# Patient Record
Sex: FEMALE | Race: OTHER | NOT HISPANIC OR LATINO
[De-identification: names, ages, dates, MRNs, and addresses within clinical notes are randomized per-mention and may not be internally consistent; named-entity substitution may affect disease eponyms.]

---

## 2018-11-26 PROBLEM — Z00.00 ENCOUNTER FOR PREVENTIVE HEALTH EXAMINATION: Status: ACTIVE | Noted: 2018-11-26

## 2018-11-28 ENCOUNTER — ASOB RESULT (OUTPATIENT)
Age: 28
End: 2018-11-28

## 2018-11-28 ENCOUNTER — APPOINTMENT (OUTPATIENT)
Dept: ANTEPARTUM | Facility: CLINIC | Age: 28
End: 2018-11-28
Payer: COMMERCIAL

## 2018-11-28 PROCEDURE — 76805 OB US >/= 14 WKS SNGL FETUS: CPT

## 2019-01-30 ENCOUNTER — OUTPATIENT (OUTPATIENT)
Dept: OUTPATIENT SERVICES | Facility: HOSPITAL | Age: 29
LOS: 1 days | End: 2019-01-30

## 2019-01-30 VITALS — TEMPERATURE: 98 F

## 2019-01-30 DIAGNOSIS — Z3A.00 WEEKS OF GESTATION OF PREGNANCY NOT SPECIFIED: ICD-10-CM

## 2019-01-30 DIAGNOSIS — O26.899 OTHER SPECIFIED PREGNANCY RELATED CONDITIONS, UNSPECIFIED TRIMESTER: ICD-10-CM

## 2019-01-30 LAB
APPEARANCE UR: CLEAR — SIGNIFICANT CHANGE UP
BASOPHILS # BLD AUTO: 0.01 K/UL — SIGNIFICANT CHANGE UP (ref 0–0.2)
BASOPHILS NFR BLD AUTO: 0.1 % — SIGNIFICANT CHANGE UP (ref 0–2)
BILIRUB UR-MCNC: NEGATIVE — SIGNIFICANT CHANGE UP
BLD GP AB SCN SERPL QL: NEGATIVE — SIGNIFICANT CHANGE UP
BLOOD UR QL VISUAL: NEGATIVE — SIGNIFICANT CHANGE UP
COLOR SPEC: SIGNIFICANT CHANGE UP
EOSINOPHIL # BLD AUTO: 0.07 K/UL — SIGNIFICANT CHANGE UP (ref 0–0.5)
EOSINOPHIL NFR BLD AUTO: 0.5 % — SIGNIFICANT CHANGE UP (ref 0–6)
FIBRINOGEN PPP-MCNC: 746.3 MG/DL — HIGH (ref 350–510)
GLUCOSE UR-MCNC: NEGATIVE — SIGNIFICANT CHANGE UP
HCT VFR BLD CALC: 33.1 % — LOW (ref 34.5–45)
HGB BLD-MCNC: 11 G/DL — LOW (ref 11.5–15.5)
IMM GRANULOCYTES NFR BLD AUTO: 0.4 % — SIGNIFICANT CHANGE UP (ref 0–1.5)
KETONES UR-MCNC: NEGATIVE — SIGNIFICANT CHANGE UP
LEUKOCYTE ESTERASE UR-ACNC: NEGATIVE — SIGNIFICANT CHANGE UP
LYMPHOCYTES # BLD AUTO: 1.89 K/UL — SIGNIFICANT CHANGE UP (ref 1–3.3)
LYMPHOCYTES # BLD AUTO: 14.1 % — SIGNIFICANT CHANGE UP (ref 13–44)
MCHC RBC-ENTMCNC: 31 PG — SIGNIFICANT CHANGE UP (ref 27–34)
MCHC RBC-ENTMCNC: 33.2 % — SIGNIFICANT CHANGE UP (ref 32–36)
MCV RBC AUTO: 93.2 FL — SIGNIFICANT CHANGE UP (ref 80–100)
MONOCYTES # BLD AUTO: 0.79 K/UL — SIGNIFICANT CHANGE UP (ref 0–0.9)
MONOCYTES NFR BLD AUTO: 5.9 % — SIGNIFICANT CHANGE UP (ref 2–14)
NEUTROPHILS # BLD AUTO: 10.56 K/UL — HIGH (ref 1.8–7.4)
NEUTROPHILS NFR BLD AUTO: 79 % — HIGH (ref 43–77)
NITRITE UR-MCNC: NEGATIVE — SIGNIFICANT CHANGE UP
NRBC # FLD: 0 K/UL — LOW (ref 25–125)
PH UR: 6.5 — SIGNIFICANT CHANGE UP (ref 5–8)
PLATELET # BLD AUTO: 386 K/UL — SIGNIFICANT CHANGE UP (ref 150–400)
PMV BLD: 8.3 FL — SIGNIFICANT CHANGE UP (ref 7–13)
PROT UR-MCNC: 10 — SIGNIFICANT CHANGE UP
RBC # BLD: 3.55 M/UL — LOW (ref 3.8–5.2)
RBC # FLD: 12.1 % — SIGNIFICANT CHANGE UP (ref 10.3–14.5)
RH IG SCN BLD-IMP: POSITIVE — SIGNIFICANT CHANGE UP
SP GR SPEC: 1.01 — SIGNIFICANT CHANGE UP (ref 1–1.04)
UROBILINOGEN FLD QL: NORMAL — SIGNIFICANT CHANGE UP
WBC # BLD: 13.37 K/UL — HIGH (ref 3.8–10.5)
WBC # FLD AUTO: 13.37 K/UL — HIGH (ref 3.8–10.5)

## 2019-01-30 RX ORDER — SODIUM CHLORIDE 9 MG/ML
1000 INJECTION, SOLUTION INTRAVENOUS ONCE
Qty: 0 | Refills: 0 | Status: COMPLETED | OUTPATIENT
Start: 2019-01-30 | End: 2019-01-30

## 2019-01-30 RX ADMIN — SODIUM CHLORIDE 1000 MILLILITER(S): 9 INJECTION, SOLUTION INTRAVENOUS at 22:03

## 2019-01-31 ENCOUNTER — ASOB RESULT (OUTPATIENT)
Age: 29
End: 2019-01-31

## 2019-01-31 ENCOUNTER — TRANSCRIPTION ENCOUNTER (OUTPATIENT)
Age: 29
End: 2019-01-31

## 2019-01-31 ENCOUNTER — OUTPATIENT (OUTPATIENT)
Dept: OUTPATIENT SERVICES | Facility: HOSPITAL | Age: 29
LOS: 1 days | End: 2019-01-31

## 2019-01-31 ENCOUNTER — APPOINTMENT (OUTPATIENT)
Dept: ANTEPARTUM | Facility: CLINIC | Age: 29
End: 2019-01-31
Payer: COMMERCIAL

## 2019-01-31 VITALS — WEIGHT: 165.35 LBS | HEIGHT: 66 IN

## 2019-01-31 DIAGNOSIS — Z04.9 ENCOUNTER FOR EXAMINATION AND OBSERVATION FOR UNSPECIFIED REASON: ICD-10-CM

## 2019-01-31 DIAGNOSIS — Z3A.00 WEEKS OF GESTATION OF PREGNANCY NOT SPECIFIED: ICD-10-CM

## 2019-01-31 DIAGNOSIS — O26.90 PREGNANCY RELATED CONDITIONS, UNSPECIFIED, UNSPECIFIED TRIMESTER: ICD-10-CM

## 2019-01-31 LAB
HBV SURFACE AG SER-ACNC: NEGATIVE — SIGNIFICANT CHANGE UP
HIV COMBO RESULT: SIGNIFICANT CHANGE UP
HIV1+2 AB SPEC QL: SIGNIFICANT CHANGE UP
RH IG SCN BLD-IMP: POSITIVE — SIGNIFICANT CHANGE UP

## 2019-01-31 PROCEDURE — 76816 OB US FOLLOW-UP PER FETUS: CPT | Mod: 26

## 2019-01-31 PROCEDURE — 76819 FETAL BIOPHYS PROFIL W/O NST: CPT | Mod: 26

## 2019-01-31 RX ORDER — SODIUM CHLORIDE 9 MG/ML
1000 INJECTION, SOLUTION INTRAVENOUS
Qty: 0 | Refills: 0 | Status: DISCONTINUED | OUTPATIENT
Start: 2019-01-31 | End: 2019-01-31

## 2019-01-31 RX ADMIN — Medication 12 MILLIGRAM(S): at 03:14

## 2019-01-31 RX ADMIN — SODIUM CHLORIDE 125 MILLILITER(S): 9 INJECTION, SOLUTION INTRAVENOUS at 03:20

## 2019-01-31 NOTE — DISCHARGE NOTE ANTEPARTUM - CARE PROVIDER_API CALL
Kerrie Sim)  [address], [address] [place] [address]  Phone: [phone]  Fax: [phone]

## 2019-01-31 NOTE — DISCHARGE NOTE ANTEPARTUM - CARE PLAN
Principal Discharge DX:	Fall  Goal:	recovery  Assessment and plan of treatment:	Please return to labor and delivery at 12am for dose of betamethasone.   Please return to labor and delivery if experiencing abdominal pain, contractions, vaginal bleeding, loss of fluid, decreased fetal movement.

## 2019-01-31 NOTE — DISCHARGE NOTE ANTEPARTUM - HOSPITAL COURSE
Pt was admitted for observation after fall, no abdominal trauma. EFM was reassuring. Irregular contractions noted at the beginning of admission, but noted to resolve during hospital course. Betamethasone was given for fetal lung maturity. Labs wnl. Pt denies feeling contractions currently.  labor precautions given. Pt understands to return to hospital at 12am for repeat dose of betamethasone.  labor precautions and abruption precautions given.

## 2019-01-31 NOTE — DISCHARGE NOTE ANTEPARTUM - PLAN OF CARE
recovery Please return to labor and delivery at 12am for dose of betamethasone.   Please return to labor and delivery if experiencing abdominal pain, contractions, vaginal bleeding, loss of fluid, decreased fetal movement.

## 2019-01-31 NOTE — DISCHARGE NOTE ANTEPARTUM - PATIENT PORTAL LINK FT
You can access the [facility] Patient Portal, offered by [facility], by registering with the following website: [email or web address]

## 2019-02-01 LAB
RUBV IGG SER-ACNC: 1 INDEX — SIGNIFICANT CHANGE UP
RUBV IGG SER-IMP: SIGNIFICANT CHANGE UP
SPECIMEN SOURCE: SIGNIFICANT CHANGE UP
T PALLIDUM AB TITR SER: NEGATIVE — SIGNIFICANT CHANGE UP

## 2019-02-01 RX ADMIN — Medication 12 MILLIGRAM(S): at 00:16

## 2019-02-02 LAB — GP B STREP GENITAL QL CULT: SIGNIFICANT CHANGE UP

## 2019-02-05 DIAGNOSIS — Z3A.30 30 WEEKS GESTATION OF PREGNANCY: ICD-10-CM

## 2019-02-21 ENCOUNTER — APPOINTMENT (OUTPATIENT)
Dept: ANTEPARTUM | Facility: CLINIC | Age: 29
End: 2019-02-21
Payer: COMMERCIAL

## 2019-02-21 ENCOUNTER — ASOB RESULT (OUTPATIENT)
Age: 29
End: 2019-02-21

## 2019-02-21 PROCEDURE — 76819 FETAL BIOPHYS PROFIL W/O NST: CPT

## 2019-02-21 PROCEDURE — 76805 OB US >/= 14 WKS SNGL FETUS: CPT

## 2019-02-21 PROCEDURE — 99241 OFFICE CONSULTATION NEW/ESTAB PATIENT 15 MIN: CPT | Mod: 25

## 2019-03-21 ENCOUNTER — ASOB RESULT (OUTPATIENT)
Age: 29
End: 2019-03-21

## 2019-03-21 ENCOUNTER — APPOINTMENT (OUTPATIENT)
Dept: ANTEPARTUM | Facility: CLINIC | Age: 29
End: 2019-03-21
Payer: COMMERCIAL

## 2019-03-21 PROCEDURE — 76819 FETAL BIOPHYS PROFIL W/O NST: CPT

## 2019-03-21 PROCEDURE — 76816 OB US FOLLOW-UP PER FETUS: CPT

## 2019-03-29 ENCOUNTER — TRANSCRIPTION ENCOUNTER (OUTPATIENT)
Age: 29
End: 2019-03-29

## 2019-04-09 ENCOUNTER — INPATIENT (INPATIENT)
Facility: HOSPITAL | Age: 29
LOS: 1 days | Discharge: HOME | End: 2019-04-11
Attending: OBSTETRICS & GYNECOLOGY | Admitting: OBSTETRICS & GYNECOLOGY

## 2019-04-09 ENCOUNTER — TRANSCRIPTION ENCOUNTER (OUTPATIENT)
Age: 29
End: 2019-04-09

## 2019-04-09 DIAGNOSIS — Z3A.00 WEEKS OF GESTATION OF PREGNANCY NOT SPECIFIED: ICD-10-CM

## 2019-04-09 DIAGNOSIS — O26.899 OTHER SPECIFIED PREGNANCY RELATED CONDITIONS, UNSPECIFIED TRIMESTER: ICD-10-CM

## 2019-04-09 LAB
BASOPHILS # BLD AUTO: 0.02 K/UL — SIGNIFICANT CHANGE UP (ref 0–0.2)
BASOPHILS NFR BLD AUTO: 0.2 % — SIGNIFICANT CHANGE UP (ref 0–2)
BLD GP AB SCN SERPL QL: NEGATIVE — SIGNIFICANT CHANGE UP
EOSINOPHIL # BLD AUTO: 0.02 K/UL — SIGNIFICANT CHANGE UP (ref 0–0.5)
EOSINOPHIL NFR BLD AUTO: 0.2 % — SIGNIFICANT CHANGE UP (ref 0–6)
HCT VFR BLD CALC: 34.7 % — SIGNIFICANT CHANGE UP (ref 34.5–45)
HGB BLD-MCNC: 11.8 G/DL — SIGNIFICANT CHANGE UP (ref 11.5–15.5)
IMM GRANULOCYTES NFR BLD AUTO: 0.6 % — SIGNIFICANT CHANGE UP (ref 0–1.5)
LYMPHOCYTES # BLD AUTO: 1.46 K/UL — SIGNIFICANT CHANGE UP (ref 1–3.3)
LYMPHOCYTES # BLD AUTO: 11.4 % — LOW (ref 13–44)
MCHC RBC-ENTMCNC: 31.1 PG — SIGNIFICANT CHANGE UP (ref 27–34)
MCHC RBC-ENTMCNC: 34 % — SIGNIFICANT CHANGE UP (ref 32–36)
MCV RBC AUTO: 91.6 FL — SIGNIFICANT CHANGE UP (ref 80–100)
MONOCYTES # BLD AUTO: 0.78 K/UL — SIGNIFICANT CHANGE UP (ref 0–0.9)
MONOCYTES NFR BLD AUTO: 6.1 % — SIGNIFICANT CHANGE UP (ref 2–14)
NEUTROPHILS # BLD AUTO: 10.5 K/UL — HIGH (ref 1.8–7.4)
NEUTROPHILS NFR BLD AUTO: 81.5 % — HIGH (ref 43–77)
NRBC # FLD: 0 K/UL — SIGNIFICANT CHANGE UP (ref 0–0)
PLATELET # BLD AUTO: 351 K/UL — SIGNIFICANT CHANGE UP (ref 150–400)
PMV BLD: 9 FL — SIGNIFICANT CHANGE UP (ref 7–13)
RBC # BLD: 3.79 M/UL — LOW (ref 3.8–5.2)
RBC # FLD: 13.3 % — SIGNIFICANT CHANGE UP (ref 10.3–14.5)
RH IG SCN BLD-IMP: POSITIVE — SIGNIFICANT CHANGE UP
WBC # BLD: 12.86 K/UL — HIGH (ref 3.8–10.5)
WBC # FLD AUTO: 12.86 K/UL — HIGH (ref 3.8–10.5)

## 2019-04-09 RX ORDER — HYDROCORTISONE 1 %
1 OINTMENT (GRAM) TOPICAL EVERY 4 HOURS
Qty: 0 | Refills: 0 | Status: DISCONTINUED | OUTPATIENT
Start: 2019-04-09 | End: 2019-04-09

## 2019-04-09 RX ORDER — LANOLIN
1 OINTMENT (GRAM) TOPICAL EVERY 6 HOURS
Qty: 0 | Refills: 0 | Status: DISCONTINUED | OUTPATIENT
Start: 2019-04-09 | End: 2019-04-11

## 2019-04-09 RX ORDER — DIBUCAINE 1 %
1 OINTMENT (GRAM) RECTAL EVERY 4 HOURS
Qty: 0 | Refills: 0 | Status: DISCONTINUED | OUTPATIENT
Start: 2019-04-09 | End: 2019-04-11

## 2019-04-09 RX ORDER — OXYCODONE HYDROCHLORIDE 5 MG/1
5 TABLET ORAL
Qty: 0 | Refills: 0 | Status: DISCONTINUED | OUTPATIENT
Start: 2019-04-09 | End: 2019-04-11

## 2019-04-09 RX ORDER — SODIUM CHLORIDE 9 MG/ML
3 INJECTION INTRAMUSCULAR; INTRAVENOUS; SUBCUTANEOUS EVERY 8 HOURS
Qty: 0 | Refills: 0 | Status: DISCONTINUED | OUTPATIENT
Start: 2019-04-09 | End: 2019-04-09

## 2019-04-09 RX ORDER — DIBUCAINE 1 %
1 OINTMENT (GRAM) RECTAL EVERY 4 HOURS
Qty: 0 | Refills: 0 | Status: DISCONTINUED | OUTPATIENT
Start: 2019-04-09 | End: 2019-04-09

## 2019-04-09 RX ORDER — SODIUM CHLORIDE 9 MG/ML
1000 INJECTION, SOLUTION INTRAVENOUS
Qty: 0 | Refills: 0 | Status: DISCONTINUED | OUTPATIENT
Start: 2019-04-09 | End: 2019-04-09

## 2019-04-09 RX ORDER — PRAMOXINE HYDROCHLORIDE 150 MG/15G
1 AEROSOL, FOAM RECTAL EVERY 4 HOURS
Qty: 0 | Refills: 0 | Status: DISCONTINUED | OUTPATIENT
Start: 2019-04-09 | End: 2019-04-11

## 2019-04-09 RX ORDER — SODIUM CHLORIDE 9 MG/ML
3 INJECTION INTRAMUSCULAR; INTRAVENOUS; SUBCUTANEOUS EVERY 8 HOURS
Qty: 0 | Refills: 0 | Status: DISCONTINUED | OUTPATIENT
Start: 2019-04-09 | End: 2019-04-10

## 2019-04-09 RX ORDER — OXYCODONE HYDROCHLORIDE 5 MG/1
5 TABLET ORAL EVERY 4 HOURS
Qty: 0 | Refills: 0 | Status: DISCONTINUED | OUTPATIENT
Start: 2019-04-09 | End: 2019-04-11

## 2019-04-09 RX ORDER — TETANUS TOXOID, REDUCED DIPHTHERIA TOXOID AND ACELLULAR PERTUSSIS VACCINE, ADSORBED 5; 2.5; 8; 8; 2.5 [IU]/.5ML; [IU]/.5ML; UG/.5ML; UG/.5ML; UG/.5ML
0.5 SUSPENSION INTRAMUSCULAR ONCE
Qty: 0 | Refills: 0 | Status: DISCONTINUED | OUTPATIENT
Start: 2019-04-09 | End: 2019-04-11

## 2019-04-09 RX ORDER — OXYTOCIN 10 UNIT/ML
333.33 VIAL (ML) INJECTION
Qty: 20 | Refills: 0 | Status: COMPLETED | OUTPATIENT
Start: 2019-04-09 | End: 2019-04-09

## 2019-04-09 RX ORDER — ACETAMINOPHEN 500 MG
975 TABLET ORAL EVERY 6 HOURS
Qty: 0 | Refills: 0 | Status: COMPLETED | OUTPATIENT
Start: 2019-04-09 | End: 2020-03-07

## 2019-04-09 RX ORDER — GLYCERIN ADULT
1 SUPPOSITORY, RECTAL RECTAL AT BEDTIME
Qty: 0 | Refills: 0 | Status: DISCONTINUED | OUTPATIENT
Start: 2019-04-09 | End: 2019-04-11

## 2019-04-09 RX ORDER — IBUPROFEN 200 MG
600 TABLET ORAL EVERY 6 HOURS
Qty: 0 | Refills: 0 | Status: DISCONTINUED | OUTPATIENT
Start: 2019-04-09 | End: 2019-04-11

## 2019-04-09 RX ORDER — OXYTOCIN 10 UNIT/ML
41.67 VIAL (ML) INJECTION
Qty: 20 | Refills: 0 | Status: DISCONTINUED | OUTPATIENT
Start: 2019-04-09 | End: 2019-04-09

## 2019-04-09 RX ORDER — DIPHENHYDRAMINE HCL 50 MG
25 CAPSULE ORAL EVERY 6 HOURS
Qty: 0 | Refills: 0 | Status: DISCONTINUED | OUTPATIENT
Start: 2019-04-09 | End: 2019-04-11

## 2019-04-09 RX ORDER — SODIUM CHLORIDE 9 MG/ML
1000 INJECTION, SOLUTION INTRAVENOUS ONCE
Qty: 0 | Refills: 0 | Status: COMPLETED | OUTPATIENT
Start: 2019-04-09 | End: 2019-04-09

## 2019-04-09 RX ORDER — SIMETHICONE 80 MG/1
80 TABLET, CHEWABLE ORAL EVERY 6 HOURS
Qty: 0 | Refills: 0 | Status: DISCONTINUED | OUTPATIENT
Start: 2019-04-09 | End: 2019-04-11

## 2019-04-09 RX ORDER — OXYTOCIN 10 UNIT/ML
333.33 VIAL (ML) INJECTION
Qty: 20 | Refills: 0 | Status: COMPLETED | OUTPATIENT
Start: 2019-04-09

## 2019-04-09 RX ORDER — MAGNESIUM HYDROXIDE 400 MG/1
30 TABLET, CHEWABLE ORAL
Qty: 0 | Refills: 0 | Status: DISCONTINUED | OUTPATIENT
Start: 2019-04-09 | End: 2019-04-11

## 2019-04-09 RX ORDER — IBUPROFEN 200 MG
600 TABLET ORAL EVERY 6 HOURS
Qty: 0 | Refills: 0 | Status: COMPLETED | OUTPATIENT
Start: 2019-04-09 | End: 2020-03-07

## 2019-04-09 RX ORDER — KETOROLAC TROMETHAMINE 30 MG/ML
30 SYRINGE (ML) INJECTION ONCE
Qty: 0 | Refills: 0 | Status: DISCONTINUED | OUTPATIENT
Start: 2019-04-09 | End: 2019-04-09

## 2019-04-09 RX ORDER — HYDROCORTISONE 1 %
1 OINTMENT (GRAM) TOPICAL EVERY 4 HOURS
Qty: 0 | Refills: 0 | Status: DISCONTINUED | OUTPATIENT
Start: 2019-04-09 | End: 2019-04-11

## 2019-04-09 RX ORDER — PRAMOXINE HYDROCHLORIDE 150 MG/15G
1 AEROSOL, FOAM RECTAL EVERY 4 HOURS
Qty: 0 | Refills: 0 | Status: DISCONTINUED | OUTPATIENT
Start: 2019-04-09 | End: 2019-04-09

## 2019-04-09 RX ORDER — AER TRAVELER 0.5 G/1
1 SOLUTION RECTAL; TOPICAL EVERY 4 HOURS
Qty: 0 | Refills: 0 | Status: DISCONTINUED | OUTPATIENT
Start: 2019-04-09 | End: 2019-04-09

## 2019-04-09 RX ORDER — AER TRAVELER 0.5 G/1
1 SOLUTION RECTAL; TOPICAL EVERY 4 HOURS
Qty: 0 | Refills: 0 | Status: DISCONTINUED | OUTPATIENT
Start: 2019-04-09 | End: 2019-04-11

## 2019-04-09 RX ORDER — DOCUSATE SODIUM 100 MG
100 CAPSULE ORAL
Qty: 0 | Refills: 0 | Status: DISCONTINUED | OUTPATIENT
Start: 2019-04-09 | End: 2019-04-11

## 2019-04-09 RX ORDER — ACETAMINOPHEN 500 MG
975 TABLET ORAL EVERY 6 HOURS
Qty: 0 | Refills: 0 | Status: DISCONTINUED | OUTPATIENT
Start: 2019-04-09 | End: 2019-04-11

## 2019-04-09 RX ADMIN — Medication 1000 MILLIUNIT(S)/MIN: at 08:47

## 2019-04-09 RX ADMIN — Medication 975 MILLIGRAM(S): at 20:22

## 2019-04-09 RX ADMIN — Medication 975 MILLIGRAM(S): at 14:15

## 2019-04-09 RX ADMIN — Medication 975 MILLIGRAM(S): at 13:29

## 2019-04-09 RX ADMIN — Medication 125 MILLIUNIT(S)/MIN: at 08:47

## 2019-04-09 RX ADMIN — Medication 1 TABLET(S): at 13:30

## 2019-04-09 RX ADMIN — SODIUM CHLORIDE 3 MILLILITER(S): 9 INJECTION INTRAMUSCULAR; INTRAVENOUS; SUBCUTANEOUS at 13:31

## 2019-04-09 RX ADMIN — SODIUM CHLORIDE 3 MILLILITER(S): 9 INJECTION INTRAMUSCULAR; INTRAVENOUS; SUBCUTANEOUS at 21:00

## 2019-04-09 RX ADMIN — SODIUM CHLORIDE 250 MILLILITER(S): 9 INJECTION, SOLUTION INTRAVENOUS at 06:03

## 2019-04-09 RX ADMIN — Medication 100 MILLIGRAM(S): at 13:30

## 2019-04-09 RX ADMIN — SODIUM CHLORIDE 2000 MILLILITER(S): 9 INJECTION, SOLUTION INTRAVENOUS at 04:57

## 2019-04-09 RX ADMIN — Medication 975 MILLIGRAM(S): at 21:00

## 2019-04-09 NOTE — OB PROVIDER H&P - HISTORY OF PRESENT ILLNESS
Pt is a 30yo  at 40.3wks, who presents with contractions and leakage of clear fluid at 215am. Pt denies vaginal bleeding. Reports good fetal movement.    Allergies:denies  Meds:PNV  PMH:denies  PSH:denies  OBGYN  -Denies any current AP complications; hx of short cervix this pregnancy

## 2019-04-09 NOTE — CHART NOTE - NSCHARTNOTEFT_GEN_A_CORE
R4 Note:     Pt examined at the bedside. She reports pressure with contractions.    O: Vital Signs Last 24 Hrs  T(C): 36.8 (09 Apr 2019 07:00), Max: 36.9 (09 Apr 2019 03:09)  T(F): 98.24 (09 Apr 2019 07:00), Max: 98.42 (09 Apr 2019 03:19)  HR: 114 (09 Apr 2019 07:34) (67 - 137)  BP: 120/79 (09 Apr 2019 07:26) (108/57 - 140/94)  BP(mean): --  RR: 18 (09 Apr 2019 05:09) (18 - 18)  SpO2: 100% (09 Apr 2019 07:34) (80% - 100%)    Gen: NAD  Abd: soft, NT, ND, gravid  Ext: no tenderness    EFM: 145, mod, +accels, -decels  Arbuckle: q2-3    Labs:                        11.8   12.86 )-----------( 351      ( 09 Apr 2019 04:40 )             34.7     Plan:  -Will start pushing  -Plan for delivery  -EFM and toco    D/W Dr. Brian Perry, PGY4

## 2019-04-09 NOTE — OB PROVIDER TRIAGE NOTE - NSOBPROVIDERNOTE_OBGYN_ALL_OB_FT
Pt is a 28yo  at 40.3wks, who presents with contractions and leakage of clear fluid at 215am. Pt denies vaginal bleeding. Reports good fetal movement.    Allergies:denies  Meds:PNV  PMH:denies  PSH:denies  OBGYN  -Denies any current AP complications; short cervix (previosuly on progesterone)    Assessment/Plan    VS: /79. HR 82, T 36.9  Abd soft, nontender, gravid  SSE: grossly ruptured; positive nitrazine, positive ferning  SVE: /-2  TAS: vertex  EFW 3175g  NST: , moderate variability, accels, no decels  Ctx q 3-5min on toco    -Evidence of labor and SROM, clr, 215am  -D/w Dr. William  -Routine labs  -GBS neg status  -Epi as per pt request

## 2019-04-09 NOTE — OB PROVIDER TRIAGE NOTE - NSHPPHYSICALEXAM_GEN_ALL_CORE
Assessment/Plan    VS: /79. HR 82, T 36.9  Abd soft, nontender, gravid  SSE: grossly ruptured; positive nitrazine, positive ferning  SVE: 5/90/-2  TAS: vertex  EFW 3175g  NST: , moderate variability, accels, no decels  Ctx q 3-5min on toco

## 2019-04-09 NOTE — CHART NOTE - NSCHARTNOTEFT_GEN_A_CORE
Patient seen and examined at bedside. Comfortable with epidural, denies feeling pressure/urge to push. VE: 9.5/100/0. Tracing reviewed baseline 150, moderate variability, +accels, no decels. Contractions q2-3 minutes. Left lateral with peanut ball. Continue to monitor.

## 2019-04-09 NOTE — OB PROVIDER H&P - ASSESSMENT
Pt is a 28yo  at 40.3wks, who presents with contractions and leakage of clear fluid at 215am. Pt denies vaginal bleeding. Reports good fetal movement.    Allergies:denies  Meds:PNV  PMH:denies  PSH:denies  OBGYN  -Denies any current AP complications; hx of short cervix this pregnancy    Assessment/Plan    VS: /79. HR 82, T 36.9  Abd soft, nontender, gravid  SSE: grossly ruptured; positive nitrazine, positive ferning  SVE: /-2  TAS: vertex  EFW 3175g  NST: , moderate variability, accels, no decels  Ctx q 3-5min on toco    -Evidence of labor and rupture; 215am, clr  -Routine labs  -GBS negative status  -Epi as per pt request

## 2019-04-09 NOTE — DISCHARGE NOTE OB - MATERIALS PROVIDED
Guide to Postpartum Care/[facility] Hearing Screen Program/Tdap Vaccination (VIS Pub Date: 2012)/[facility]  Screening Program/Shaken Baby Prevention Handout/Birth Certificate Instructions/[place]  Immunization Record/Breastfeeding Log/Vaccinations

## 2019-04-09 NOTE — DISCHARGE NOTE OB - MEDICATION SUMMARY - MEDICATIONS TO TAKE
I will START or STAY ON the medications listed below when I get home from the hospital:    Prenatal 1 oral capsule  -- Indication: For Nutrition     acetaminophen 325 mg oral tablet  -- 3 tab(s) by mouth every 6 hours  -- Indication: For pain     ibuprofen 600 mg oral tablet  -- 1 tab(s) by mouth every 6 hours  -- Indication: For pain     lanolin topical ointment  -- 1 application on skin every 6 hours, As needed, Sore Nipples  -- Indication: For Nipple discomfort     Prenatal Multivitamins with Folic Acid 1 mg oral tablet  -- 1 tab(s) by mouth once a day  -- Indication: For Nutrition     docusate sodium 100 mg oral capsule  -- 1 cap(s) by mouth 2 times a day, As needed, Stool Softening  -- Indication: For constipation

## 2019-04-09 NOTE — DISCHARGE NOTE OB - CARE PROVIDER_API CALL
Valeria Torres)  Gynecology Obstetrics  Gynecology  [address] [place] [address]  Phone: [phone]  Fax: [phone]  Follow Up Time:

## 2019-04-09 NOTE — DISCHARGE NOTE OB - PATIENT PORTAL LINK FT
You can access the [facility] Patient Portal, offered by [facility], by registering with the following website: [email or web address]

## 2019-04-09 NOTE — OB RN PATIENT PROFILE - ALERT: PERTINENT HISTORY
Ultra Screen at 12 Weeks/20 Week Level II Sonogram/1st Trimester Sonogram/BioPhysical Profile(s)/Fetal Non-Stress Test (NST)

## 2019-04-09 NOTE — OB PROVIDER TRIAGE NOTE - NSNYCREQUIREMENTS_OBGYN_ALL_OB
Progress Notes by Mirza Staton MD at 01/04/18 04:19 PM     Author:  Mirza Staton MD Service:  (none) Author Type:  Physician     Filed:  01/05/18 02:07 PM Encounter Date:  1/4/2018 Status:  Addendum     :  Mirza Staton MD (Physician)            Patient notified.[LG1.1M]    CC:   Chief Complaint     Patient presents with     • Cough      xfew days, increases at night   • Sinus Problem      congestion, with some blood in nose with blowing, pt ill about 3 weeks ago, sx got better, now returned a few days ago   • Trauma      F.B. in right foot, x 12/8.         Dahlia Smith is a 17 year old female  with a[BA1.1T] 3-4 d[BA1.1M] history of:    cough ([BA1.1T]+[BA1.1M])  rhinorrhea or stuffiness ([BA1.1T]+[BA1.1M])  sore throat. ([BA1.1T]+[BA1.1M])  Fever([BA1.1T]-[BA1.1M])  diarrhea ([BA1.1T]-[BA1.1M])  vomiting ([BA1.1T]-[BA1.1M])  Nausea ([BA1.1T]-[BA1.1M])  abdominal pain ([BA1.1T]-[BA1.1M])  Chest tightness ([BA1.1T]-[BA1.1M])  Sputum ([BA1.1T]-[BA1.1M])  Ear pain([BA1.1T]-[BA1.1M])[BA1.1T]    Patient states that she had eye pain a few days ago as well as fever feels better in that regard now    Patient also states that recently she was in [place] and stepped on what she thought was glass.  She thought she removed it but now has been having pain for the last couple weeks since the injury.    Patient also states that that she has had short-lived nosebleeds off and on for the past few days with blowing her nose.  She denies easy bleeding but does bruise easily.  She has light periods there is no family history of excessive bleeding[BA1.1M]    No Known Allergies    Current Outpatient Prescriptions     Medication  Sig   • albuterol (PROAIR HFA) 108 (90 BASE) MCG/ACT inhaler Inhale 2 Puffs by mouth every 4 (four) hours as needed for Wheezing or Shortness of Breath.       Patient Active Problem List    Diagnosis    • CHILD HEALTH EXAM   • Allergic rhinitis, cause unspecified   • Other chronic allergic  conjunctivitis       FH no one is ill     OBJECTIVE:   /50  Pulse 80  Temp 98.7 °F (37.1 °C)  Resp 16  LMP 01/02/2018  SpO2 99%  Gen: alert, fully oriented and[BA1.1T] NAD[BA1.1M]  HEENT:  No redness or d/c of the eyes..  (+)erythema of the turbinates[BA1.1T] with small prominent vessel in the left nasal septum[BA1.1M]  Erythema throat (+)    Exudate (-)  Ears- canals clear, normal LM and no redness or bulging of TMs  NECK:  Supple, no mass. Nontender nodes  CHEST: wheezing ([BA1.1T]-[BA1.1M]), air entry decreased  ([BA1.1T]-[BA1.1M]), adventital sounds ([BA1.1T]-[BA1.1M])prolonged exp ([BA1.1T]-[BA1.1M])  HEART: regular, normal S1,S2, no murmur[BA1.1T]    Examination of her right foot reveals a portal of entry on the ball of the foot.  It is not red or swollen but is mildly tender and slightly raised.    X-ray of the foot reveals no evidence of glass or radiopaque foreign body pending radiology report[BA1.1M]    Rapid strep test is negative     ASSESSMENT:   URI  Pharyngitis[BA1.1T]  Mild epistaxis  Foreign body of the foot 12-8 no evidence of any radiopaque foreign body at this point.[BA1.1M]    PLAN:   OTC meds     For typical viral illness expect in general 4-7 days of sore throat and fever and 7-10 days of cough.     Saline nasal spray several times per day    Vaseline to nasal lining    Humidity in house to >40%    TO ER if worse with fever,chest pain  and/or shortness of breath[BA1.1T]      Torso if the discomfort in the foot persist for possibility of moving a non-radiopaque foreign body with the area gets red swollen or tender    PT[BA1.1M] should call us or the PCP if patient  is  not improving or getting worse.    AVS given    Additional oral discharge instructions given and discussed with the[BA1.1T] PT[BA1.1M].[BA1.1T]    Pt and her mom[BA1.1M] voiced understanding of instructions given and  felt that  we accomplished everything[BA1.1T] theythem[BA1.1M] needed to do today. I asked[BA1.1T]  them to[BA1.1M]call me if there are any further questions or concerns.     Electronically Signed by:    Mirza Staton MD , 1/5/2018[BA1.1T]          Revision History        User Key Date/Time User Provider Type Action    > BA1.1 01/05/18 02:07 PM Mirza Staton MD Physician Addend     LG1.1 01/04/18 04:19 PM Kelle Boswell, RN Registered Nurse Sign    M - Manual, T - Template             BHAVANI

## 2019-04-09 NOTE — OB PROVIDER TRIAGE NOTE - HISTORY OF PRESENT ILLNESS
Pt is a 30yo  at 40.3wks, who presents with contractions and leakage of clear fluid at 215am. Pt denies vaginal bleeding. Reports good fetal movement.    Allergies:denies  Meds:PNV  PMH:denies  PSH:denies  OBGYN  -Denies any current AP complications Pt is a 30yo  at 40.3wks, who presents with contractions and leakage of clear fluid at 215am. Pt denies vaginal bleeding. Reports good fetal movement.    Allergies:denies  Meds:PNV  PMH:denies  PSH:denies  OBGYN  -Denies any current AP complications; hx of short cervix this pregnancy

## 2019-04-10 LAB — T PALLIDUM AB TITR SER: NEGATIVE — SIGNIFICANT CHANGE UP

## 2019-04-10 RX ORDER — ACETAMINOPHEN 500 MG
3 TABLET ORAL
Qty: 0 | Refills: 0 | DISCHARGE
Start: 2019-04-10

## 2019-04-10 RX ORDER — LANOLIN
1 OINTMENT (GRAM) TOPICAL
Qty: 0 | Refills: 0 | DISCHARGE
Start: 2019-04-10

## 2019-04-10 RX ORDER — IBUPROFEN 200 MG
1 TABLET ORAL
Qty: 0 | Refills: 0 | DISCHARGE
Start: 2019-04-10

## 2019-04-10 RX ORDER — DOCUSATE SODIUM 100 MG
1 CAPSULE ORAL
Qty: 0 | Refills: 0 | DISCHARGE
Start: 2019-04-10

## 2019-04-10 RX ADMIN — Medication 975 MILLIGRAM(S): at 09:45

## 2019-04-10 RX ADMIN — Medication 1 TABLET(S): at 12:14

## 2019-04-10 RX ADMIN — Medication 975 MILLIGRAM(S): at 22:20

## 2019-04-10 RX ADMIN — Medication 600 MILLIGRAM(S): at 02:00

## 2019-04-10 RX ADMIN — Medication 975 MILLIGRAM(S): at 09:31

## 2019-04-10 RX ADMIN — Medication 975 MILLIGRAM(S): at 23:00

## 2019-04-10 RX ADMIN — Medication 600 MILLIGRAM(S): at 01:25

## 2019-04-10 RX ADMIN — Medication 100 MILLIGRAM(S): at 09:32

## 2019-04-10 RX ADMIN — SODIUM CHLORIDE 3 MILLILITER(S): 9 INJECTION INTRAMUSCULAR; INTRAVENOUS; SUBCUTANEOUS at 05:30

## 2019-04-10 RX ADMIN — Medication 975 MILLIGRAM(S): at 16:14

## 2019-04-10 RX ADMIN — Medication 975 MILLIGRAM(S): at 17:00

## 2019-04-11 VITALS
DIASTOLIC BLOOD PRESSURE: 74 MMHG | RESPIRATION RATE: 18 BRPM | TEMPERATURE: 98 F | OXYGEN SATURATION: 100 % | HEART RATE: 76 BPM | SYSTOLIC BLOOD PRESSURE: 118 MMHG

## 2019-04-11 RX ADMIN — Medication 600 MILLIGRAM(S): at 06:30

## 2019-04-11 RX ADMIN — Medication 600 MILLIGRAM(S): at 06:58

## 2019-04-11 RX ADMIN — Medication 1 APPLICATION(S): at 06:29

## 2019-05-30 NOTE — OB RN PATIENT PROFILE - AS SC BRADEN FRICTION
- HD today   - Outpt HD reinstated by CM - spoke with Dr. Juan Aden, pt is [facility] for HD tomorrow    - last HD monday   (outpt HD [facility] MWF) - [facility] for HD today post [facility] (3) no apparent problem

## 2020-04-28 NOTE — PATIENT PROFILE OB - TOBACCO USE
Never smoker Elmer Gooden)  EEGEpilepsy; Neurology  [address] [place] [address], [address]  [place] [address]  Phone: [phone]  Fax: [phone]  Follow Up Time:

## 2020-12-16 PROBLEM — Z78.9 OTHER SPECIFIED HEALTH STATUS: Chronic | Status: ACTIVE | Noted: 2019-04-09

## 2021-01-19 ENCOUNTER — APPOINTMENT (OUTPATIENT)
Dept: PEDIATRIC CARDIOLOGY | Facility: CLINIC | Age: 31
End: 2021-01-19
Payer: COMMERCIAL

## 2021-01-19 PROCEDURE — 76825 ECHO EXAM OF FETAL HEART: CPT

## 2021-01-19 PROCEDURE — 99072 ADDL SUPL MATRL&STAF TM PHE: CPT

## 2021-01-19 PROCEDURE — 76827 ECHO EXAM OF FETAL HEART: CPT

## 2021-01-19 PROCEDURE — 76820 UMBILICAL ARTERY ECHO: CPT

## 2021-01-19 PROCEDURE — 93325 DOPPLER ECHO COLOR FLOW MAPG: CPT | Mod: 59

## 2021-01-19 PROCEDURE — 99202 OFFICE O/P NEW SF 15 MIN: CPT | Mod: 25

## 2021-01-21 ENCOUNTER — ASOB RESULT (OUTPATIENT)
Age: 31
End: 2021-01-21

## 2021-01-21 ENCOUNTER — APPOINTMENT (OUTPATIENT)
Dept: ANTEPARTUM | Facility: CLINIC | Age: 31
End: 2021-01-21
Payer: COMMERCIAL

## 2021-01-21 PROCEDURE — 99072 ADDL SUPL MATRL&STAF TM PHE: CPT

## 2021-01-21 PROCEDURE — 76817 TRANSVAGINAL US OBSTETRIC: CPT

## 2021-01-21 PROCEDURE — 76805 OB US >/= 14 WKS SNGL FETUS: CPT

## 2021-05-24 ENCOUNTER — TRANSCRIPTION ENCOUNTER (OUTPATIENT)
Age: 31
End: 2021-05-24

## 2021-05-24 ENCOUNTER — INPATIENT (INPATIENT)
Facility: HOSPITAL | Age: 31
LOS: 0 days | Discharge: HOME | End: 2021-05-25
Attending: OBSTETRICS & GYNECOLOGY | Admitting: OBSTETRICS & GYNECOLOGY

## 2021-05-24 VITALS — TEMPERATURE: 98 F

## 2021-05-24 LAB
BASOPHILS # BLD AUTO: 0.01 K/UL — SIGNIFICANT CHANGE UP (ref 0–0.2)
BASOPHILS NFR BLD AUTO: 0.1 % — SIGNIFICANT CHANGE UP (ref 0–2)
BLD GP AB SCN SERPL QL: NEGATIVE — SIGNIFICANT CHANGE UP
COVID-19 SPIKE DOMAIN AB INTERP: NEGATIVE — SIGNIFICANT CHANGE UP
COVID-19 SPIKE DOMAIN ANTIBODY RESULT: 0.4 U/ML — SIGNIFICANT CHANGE UP
EOSINOPHIL # BLD AUTO: 0.03 K/UL — SIGNIFICANT CHANGE UP (ref 0–0.5)
EOSINOPHIL NFR BLD AUTO: 0.3 % — SIGNIFICANT CHANGE UP (ref 0–6)
HCT VFR BLD CALC: 32.6 % — LOW (ref 34.5–45)
HGB BLD-MCNC: 11 G/DL — LOW (ref 11.5–15.5)
IANC: 6.18 K/UL — SIGNIFICANT CHANGE UP (ref 1.5–8.5)
IMM GRANULOCYTES NFR BLD AUTO: 0.6 % — SIGNIFICANT CHANGE UP (ref 0–1.5)
LYMPHOCYTES # BLD AUTO: 1.94 K/UL — SIGNIFICANT CHANGE UP (ref 1–3.3)
LYMPHOCYTES # BLD AUTO: 21.7 % — SIGNIFICANT CHANGE UP (ref 13–44)
MCHC RBC-ENTMCNC: 31.4 PG — SIGNIFICANT CHANGE UP (ref 27–34)
MCHC RBC-ENTMCNC: 33.7 GM/DL — SIGNIFICANT CHANGE UP (ref 32–36)
MCV RBC AUTO: 93.1 FL — SIGNIFICANT CHANGE UP (ref 80–100)
MONOCYTES # BLD AUTO: 0.73 K/UL — SIGNIFICANT CHANGE UP (ref 0–0.9)
MONOCYTES NFR BLD AUTO: 8.2 % — SIGNIFICANT CHANGE UP (ref 2–14)
NEUTROPHILS # BLD AUTO: 6.18 K/UL — SIGNIFICANT CHANGE UP (ref 1.8–7.4)
NEUTROPHILS NFR BLD AUTO: 69.1 % — SIGNIFICANT CHANGE UP (ref 43–77)
NRBC # BLD: 0 /100 WBCS — SIGNIFICANT CHANGE UP
NRBC # FLD: 0 K/UL — SIGNIFICANT CHANGE UP
PLATELET # BLD AUTO: 373 K/UL — SIGNIFICANT CHANGE UP (ref 150–400)
RBC # BLD: 3.5 M/UL — LOW (ref 3.8–5.2)
RBC # FLD: 13.5 % — SIGNIFICANT CHANGE UP (ref 10.3–14.5)
RH IG SCN BLD-IMP: POSITIVE — SIGNIFICANT CHANGE UP
SARS-COV-2 IGG+IGM SERPL QL IA: 0.4 U/ML — SIGNIFICANT CHANGE UP
SARS-COV-2 IGG+IGM SERPL QL IA: NEGATIVE — SIGNIFICANT CHANGE UP
T PALLIDUM AB TITR SER: NEGATIVE — SIGNIFICANT CHANGE UP
WBC # BLD: 8.94 K/UL — SIGNIFICANT CHANGE UP (ref 3.8–10.5)
WBC # FLD AUTO: 8.94 K/UL — SIGNIFICANT CHANGE UP (ref 3.8–10.5)

## 2021-05-24 RX ORDER — LANOLIN
1 OINTMENT (GRAM) TOPICAL EVERY 6 HOURS
Refills: 0 | Status: DISCONTINUED | OUTPATIENT
Start: 2021-05-24 | End: 2021-05-25

## 2021-05-24 RX ORDER — TETANUS TOXOID, REDUCED DIPHTHERIA TOXOID AND ACELLULAR PERTUSSIS VACCINE, ADSORBED 5; 2.5; 8; 8; 2.5 [IU]/.5ML; [IU]/.5ML; UG/.5ML; UG/.5ML; UG/.5ML
0.5 SUSPENSION INTRAMUSCULAR ONCE
Refills: 0 | Status: DISCONTINUED | OUTPATIENT
Start: 2021-05-24 | End: 2021-05-25

## 2021-05-24 RX ORDER — HYDROCORTISONE 1 %
1 OINTMENT (GRAM) TOPICAL EVERY 6 HOURS
Refills: 0 | Status: DISCONTINUED | OUTPATIENT
Start: 2021-05-24 | End: 2021-05-25

## 2021-05-24 RX ORDER — LANOLIN
1 OINTMENT (GRAM) TOPICAL
Qty: 0 | Refills: 0 | DISCHARGE
Start: 2021-05-24

## 2021-05-24 RX ORDER — MAGNESIUM HYDROXIDE 400 MG/1
30 TABLET, CHEWABLE ORAL
Refills: 0 | Status: DISCONTINUED | OUTPATIENT
Start: 2021-05-24 | End: 2021-05-25

## 2021-05-24 RX ORDER — ACETAMINOPHEN 500 MG
975 TABLET ORAL
Refills: 0 | Status: DISCONTINUED | OUTPATIENT
Start: 2021-05-24 | End: 2021-05-25

## 2021-05-24 RX ORDER — PRAMOXINE HYDROCHLORIDE 150 MG/15G
1 AEROSOL, FOAM RECTAL EVERY 4 HOURS
Refills: 0 | Status: DISCONTINUED | OUTPATIENT
Start: 2021-05-24 | End: 2021-05-25

## 2021-05-24 RX ORDER — OXYCODONE HYDROCHLORIDE 5 MG/1
5 TABLET ORAL
Refills: 0 | Status: DISCONTINUED | OUTPATIENT
Start: 2021-05-24 | End: 2021-05-25

## 2021-05-24 RX ORDER — IBUPROFEN 200 MG
600 TABLET ORAL EVERY 6 HOURS
Refills: 0 | Status: DISCONTINUED | OUTPATIENT
Start: 2021-05-24 | End: 2021-05-25

## 2021-05-24 RX ORDER — SIMETHICONE 80 MG/1
80 TABLET, CHEWABLE ORAL EVERY 4 HOURS
Refills: 0 | Status: DISCONTINUED | OUTPATIENT
Start: 2021-05-24 | End: 2021-05-25

## 2021-05-24 RX ORDER — CARBOPROST TROMETHAMINE 250 UG/ML
250 INJECTION, SOLUTION INTRAMUSCULAR ONCE
Refills: 0 | Status: COMPLETED | OUTPATIENT
Start: 2021-05-24 | End: 2021-05-24

## 2021-05-24 RX ORDER — DIBUCAINE 1 %
1 OINTMENT (GRAM) RECTAL EVERY 6 HOURS
Refills: 0 | Status: DISCONTINUED | OUTPATIENT
Start: 2021-05-24 | End: 2021-05-25

## 2021-05-24 RX ORDER — OXYTOCIN 10 UNIT/ML
VIAL (ML) INJECTION
Qty: 30 | Refills: 0 | Status: DISCONTINUED | OUTPATIENT
Start: 2021-05-24 | End: 2021-05-24

## 2021-05-24 RX ORDER — AER TRAVELER 0.5 G/1
1 SOLUTION RECTAL; TOPICAL EVERY 4 HOURS
Refills: 0 | Status: DISCONTINUED | OUTPATIENT
Start: 2021-05-24 | End: 2021-05-25

## 2021-05-24 RX ORDER — ACETAMINOPHEN 500 MG
3 TABLET ORAL
Qty: 0 | Refills: 0 | DISCHARGE
Start: 2021-05-24

## 2021-05-24 RX ORDER — KETOROLAC TROMETHAMINE 30 MG/ML
30 SYRINGE (ML) INJECTION ONCE
Refills: 0 | Status: DISCONTINUED | OUTPATIENT
Start: 2021-05-24 | End: 2021-05-25

## 2021-05-24 RX ORDER — OXYTOCIN 10 UNIT/ML
333.33 VIAL (ML) INJECTION
Qty: 20 | Refills: 0 | Status: DISCONTINUED | OUTPATIENT
Start: 2021-05-24 | End: 2021-05-25

## 2021-05-24 RX ORDER — DIPHENOXYLATE HCL/ATROPINE 2.5-.025MG
2 TABLET ORAL ONCE
Refills: 0 | Status: DISCONTINUED | OUTPATIENT
Start: 2021-05-24 | End: 2021-05-24

## 2021-05-24 RX ORDER — SODIUM CHLORIDE 9 MG/ML
3 INJECTION INTRAMUSCULAR; INTRAVENOUS; SUBCUTANEOUS EVERY 8 HOURS
Refills: 0 | Status: DISCONTINUED | OUTPATIENT
Start: 2021-05-24 | End: 2021-05-25

## 2021-05-24 RX ORDER — SODIUM CHLORIDE 9 MG/ML
1000 INJECTION, SOLUTION INTRAVENOUS
Refills: 0 | Status: DISCONTINUED | OUTPATIENT
Start: 2021-05-24 | End: 2021-05-24

## 2021-05-24 RX ORDER — BENZOCAINE 10 %
1 GEL (GRAM) MUCOUS MEMBRANE EVERY 6 HOURS
Refills: 0 | Status: DISCONTINUED | OUTPATIENT
Start: 2021-05-24 | End: 2021-05-25

## 2021-05-24 RX ORDER — IBUPROFEN 200 MG
600 TABLET ORAL EVERY 6 HOURS
Refills: 0 | Status: COMPLETED | OUTPATIENT
Start: 2021-05-24 | End: 2022-04-22

## 2021-05-24 RX ORDER — OXYCODONE HYDROCHLORIDE 5 MG/1
5 TABLET ORAL ONCE
Refills: 0 | Status: DISCONTINUED | OUTPATIENT
Start: 2021-05-24 | End: 2021-05-25

## 2021-05-24 RX ORDER — HYDROCORTISONE 1 %
1 OINTMENT (GRAM) TOPICAL
Qty: 0 | Refills: 0 | DISCHARGE
Start: 2021-05-24

## 2021-05-24 RX ORDER — SODIUM CHLORIDE 9 MG/ML
1000 INJECTION, SOLUTION INTRAVENOUS ONCE
Refills: 0 | Status: COMPLETED | OUTPATIENT
Start: 2021-05-24 | End: 2021-05-24

## 2021-05-24 RX ORDER — DIPHENHYDRAMINE HCL 50 MG
25 CAPSULE ORAL EVERY 6 HOURS
Refills: 0 | Status: DISCONTINUED | OUTPATIENT
Start: 2021-05-24 | End: 2021-05-25

## 2021-05-24 RX ADMIN — SODIUM CHLORIDE 2000 MILLILITER(S): 9 INJECTION, SOLUTION INTRAVENOUS at 07:10

## 2021-05-24 RX ADMIN — Medication 1000 MILLIUNIT(S)/MIN: at 14:46

## 2021-05-24 RX ADMIN — SODIUM CHLORIDE 125 MILLILITER(S): 9 INJECTION, SOLUTION INTRAVENOUS at 07:52

## 2021-05-24 RX ADMIN — Medication 600 MILLIGRAM(S): at 23:25

## 2021-05-24 RX ADMIN — SODIUM CHLORIDE 3 MILLILITER(S): 9 INJECTION INTRAMUSCULAR; INTRAVENOUS; SUBCUTANEOUS at 23:24

## 2021-05-24 RX ADMIN — CARBOPROST TROMETHAMINE 250 MICROGRAM(S): 250 INJECTION, SOLUTION INTRAMUSCULAR at 13:22

## 2021-05-24 RX ADMIN — Medication 2 TABLET(S): at 13:22

## 2021-05-24 RX ADMIN — Medication 600 MILLIGRAM(S): at 18:21

## 2021-05-24 RX ADMIN — Medication 2 MILLIUNIT(S)/MIN: at 07:52

## 2021-05-24 RX ADMIN — Medication 600 MILLIGRAM(S): at 18:47

## 2021-05-24 NOTE — DISCHARGE NOTE OB - PATIENT PORTAL LINK FT
You can access the FollowMyHealth Patient Portal offered by [facility] by registering at the following website: [email or web address]. By joining [employer]’s FollowMyHealth portal, you will also be able to view your health information using other applications (apps) compatible with our system.

## 2021-05-24 NOTE — OB PROVIDER LABOR PROGRESS NOTE - ASSESSMENT
Plan   cont pitocin   EFM Cat 1   Cont EFM/Petros  anticipate      Gina Baig MD PGY1   to be d/w Dr. Larose

## 2021-05-24 NOTE — OB RN PATIENT PROFILE - NS_ACCOMPAINEDBY_OBGYN_ALL_OB
Detail Level: Detailed Tetracycline Pregnancy And Lactation Text: This medication is Pregnancy Category D and not consider safe during pregnancy. It is also excreted in breast milk. Spironolactone Counseling: Patient advised regarding risks of diarrhea, abdominal pain, hyperkalemia, birth defects (for female patients), liver toxicity and renal toxicity. The patient may need blood work to monitor liver and kidney function and potassium levels while on therapy. The patient verbalized understanding of the proper use and possible adverse effects of spironolactone.  All of the patient's questions and concerns were addressed. Tazorac Pregnancy And Lactation Text: This medication is not safe during pregnancy. It is unknown if this medication is excreted in breast milk. Topical Retinoid counseling:  Patient advised to apply a pea-sized amount only at bedtime and wait 30 minutes after washing their face before applying.  If too drying, patient may add a non-comedogenic moisturizer. The patient verbalized understanding of the proper use and possible adverse effects of retinoids.  All of the patient's questions and concerns were addressed. Benzoyl Peroxide Counseling: Patient counseled that medicine may cause skin irritation and bleach clothing.  In the event of skin irritation, the patient was advised to reduce the amount of the drug applied or use it less frequently.   The patient verbalized understanding of the proper use and possible adverse effects of benzoyl peroxide.  All of the patient's questions and concerns were addressed. Use Enhanced Medication Counseling?: No High Dose Vitamin A Pregnancy And Lactation Text: High dose vitamin A therapy is contraindicated during pregnancy and breast feeding. Erythromycin Pregnancy And Lactation Text: This medication is Pregnancy Category B and is considered safe during pregnancy. It is also excreted in breast milk. Birth Control Pills Pregnancy And Lactation Text: This medication should be avoided if pregnant and for the first 30 days post-partum. Topical Sulfur Applications Pregnancy And Lactation Text: This medication is Pregnancy Category C and has an unknown safety profile during pregnancy. It is unknown if this topical medication is excreted in breast milk. Topical Retinoid Pregnancy And Lactation Text: This medication is Pregnancy Category C. It is unknown if this medication is excreted in breast milk. Minocycline Counseling: Patient advised regarding possible photosensitivity and discoloration of the teeth, skin, lips, tongue and gums.  Patient instructed to avoid sunlight, if possible.  When exposed to sunlight, patients should wear protective clothing, sunglasses, and sunscreen.  The patient was instructed to call the office immediately if the following severe adverse effects occur:  hearing changes, easy bruising/bleeding, severe headache, or vision changes.  The patient verbalized understanding of the proper use and possible adverse effects of minocycline.  All of the patient's questions and concerns were addressed. Tazorac Counseling:  Patient advised that medication is irritating and drying.  Patient may need to apply sparingly and wash off after an hour before eventually leaving it on overnight.  The patient verbalized understanding of the proper use and possible adverse effects of tazorac.  All of the patient's questions and concerns were addressed. Isotretinoin Counseling: Patient should get monthly blood tests, not donate blood, not drive at night if vision affected, not share medication, and not undergo elective surgery for 6 months after tx completed. Side effects reviewed, pt to contact office should one occur. Erythromycin Counseling:  I discussed with the patient the risks of erythromycin including but not limited to GI upset, allergic reaction, drug rash, diarrhea, increase in liver enzymes, and yeast infections. Birth Control Pills Counseling: Birth Control Pill Counseling: I discussed with the patient the potential side effects of OCPs including but not limited to increased risk of stroke, heart attack, thrombophlebitis, deep venous thrombosis, hepatic adenomas, breast changes, GI upset, headaches, and depression.  The patient verbalized understanding of the proper use and possible adverse effects of OCPs. All of the patient's questions and concerns were addressed. Azithromycin Counseling:  I discussed with the patient the risks of azithromycin including but not limited to GI upset, allergic reaction, drug rash, diarrhea, and yeast infections. Detail Level: Zone Dapsone Pregnancy And Lactation Text: This medication is Pregnancy Category C and is not considered safe during pregnancy or breast feeding. Tetracycline Counseling: Patient counseled regarding possible photosensitivity and increased risk for sunburn.  Patient instructed to avoid sunlight, if possible.  When exposed to sunlight, patients should wear protective clothing, sunglasses, and sunscreen.  The patient was instructed to call the office immediately if the following severe adverse effects occur:  hearing changes, easy bruising/bleeding, severe headache, or vision changes.  The patient verbalized understanding of the proper use and possible adverse effects of tetracycline.  All of the patient's questions and concerns were addressed. Patient understands to avoid pregnancy while on therapy due to potential birth defects. Topical Clindamycin Pregnancy And Lactation Text: This medication is Pregnancy Category B and is considered safe during pregnancy. It is unknown if it is excreted in breast milk. Isotretinoin Pregnancy And Lactation Text: This medication is Pregnancy Category X and is considered extremely dangerous during pregnancy. It is unknown if it is excreted in breast milk. Topical Clindamycin Counseling: Patient counseled that this medication may cause skin irritation or allergic reactions.  In the event of skin irritation, the patient was advised to reduce the amount of the drug applied or use it less frequently.   The patient verbalized understanding of the proper use and possible adverse effects of clindamycin.  All of the patient's questions and concerns were addressed. Bactrim Pregnancy And Lactation Text: This medication is Pregnancy Category D and is known to cause fetal risk.  It is also excreted in breast milk. Dapsone Counseling: I discussed with the patient the risks of dapsone including but not limited to hemolytic anemia, agranulocytosis, rashes, methemoglobinemia, kidney failure, peripheral neuropathy, headaches, GI upset, and liver toxicity.  Patients who start dapsone require monitoring including baseline LFTs and weekly CBCs for the first month, then every month thereafter.  The patient verbalized understanding of the proper use and possible adverse effects of dapsone.  All of the patient's questions and concerns were addressed. Doxycycline Pregnancy And Lactation Text: This medication is Pregnancy Category D and not consider safe during pregnancy. It is also excreted in breast milk but is considered safe for shorter treatment courses. High Dose Vitamin A Counseling: Side effects reviewed, pt to contact office should one occur. Spironolactone Pregnancy And Lactation Text: This medication can cause feminization of the male fetus and should be avoided during pregnancy. The active metabolite is also found in breast milk. Benzoyl Peroxide Pregnancy And Lactation Text: This medication is Pregnancy Category C. It is unknown if benzoyl peroxide is excreted in breast milk. Bactrim Counseling:  I discussed with the patient the risks of sulfa antibiotics including but not limited to GI upset, allergic reaction, drug rash, diarrhea, dizziness, photosensitivity, and yeast infections.  Rarely, more serious reactions can occur including but not limited to aplastic anemia, agranulocytosis, methemoglobinemia, blood dyscrasias, liver or kidney failure, lung infiltrates or desquamative/blistering drug rashes. Doxycycline Counseling:  Patient counseled regarding possible photosensitivity and increased risk for sunburn.  Patient instructed to avoid sunlight, if possible.  When exposed to sunlight, patients should wear protective clothing, sunglasses, and sunscreen.  The patient was instructed to call the office immediately if the following severe adverse effects occur:  hearing changes, easy bruising/bleeding, severe headache, or vision changes.  The patient verbalized understanding of the proper use and possible adverse effects of doxycycline.  All of the patient's questions and concerns were addressed. Topical Sulfur Applications Counseling: Topical Sulfur Counseling: Patient counseled that this medication may cause skin irritation or allergic reactions.  In the event of skin irritation, the patient was advised to reduce the amount of the drug applied or use it less frequently.   The patient verbalized understanding of the proper use and possible adverse effects of topical sulfur application.  All of the patient's questions and concerns were addressed. Azithromycin Pregnancy And Lactation Text: This medication is considered safe during pregnancy and is also secreted in breast milk. Spouse

## 2021-05-24 NOTE — DISCHARGE NOTE OB - CARE PROVIDER_API CALL
Bin Larose)  Obstetrics and Gynecology Obstetrics and Gynocology  [address] [place] [address]  Phone: [phone]  Fax: [phone]  Follow Up Time: 1 week

## 2021-05-24 NOTE — DISCHARGE NOTE OB - CARE PLAN
Principal Discharge DX:	 (normal spontaneous vaginal delivery)  Goal:	post partum  Assessment and plan of treatment:	recovery

## 2021-05-24 NOTE — OB PROVIDER H&P - HISTORY OF PRESENT ILLNESS
30 yo  @ 39.1 wks reporting here for scheduled elective induction for advanced labor, as per patient. denies vb, lof, contractions, reports +GFM. pt reports has contractions x 2 weeks bewteen 1-4pm but not the last 2 days. AP course complicated by being conceived on Letrozole, hx of prec delivery and hx of short cx on progesterone in last pregnancy, reports say this preg vanishing twin. s/p echo. denies fever chills ha n/v new swelling vision changes cp sob cough. last saw OB last Monday, EFW 7#8.    GBS: negative  meds: PNV iron- stopped 3 days ago  all: macrobid  PMH: denies  PSH: denies  gyn hx: denies  ob hx:  2019 short cx on progesterone

## 2021-05-24 NOTE — OB PROVIDER TRIAGE NOTE - HISTORY OF PRESENT ILLNESS
32 yo  @ 39.1 wks reporting here for scheduled elective induction for advanced labor, as per patient. denies vb, lof, contractions, reports +GFM. pt reports has contractions x 2 weeks bewteen 1-4pm but not the last 2 days. AP course complicated by being conceived on Letrozole, hx of prec delivery and hx of short cx on progesterone in last pregnancy, reports say this preg vanishing twin. s/p echo. denies fever chills ha n/v new swelling vision changes cp sob cough. last saw OB last Monday, EFW 7#8.    GBS: negative  meds: PNV iron- stopped 3 days ago  all: macrobid  PMH: denies  PSH: denies  gyn hx: denies  ob hx:  2019 short cx on progesterone

## 2021-05-24 NOTE — OB RN DELIVERY SUMMARY - NS_SEPSISRSKCALC_OBGYN_ALL_OB_FT
EOS calculated successfully. EOS Risk Factor: 0.5/1000 live births ([facility] national incidence); GA=39w1d; Temp=98.6; ROM=1.717; GBS='Negative'; Antibiotics='No antibiotics or any antibiotics < 2 hrs prior to birth'

## 2021-05-24 NOTE — OB PROVIDER DELIVERY SUMMARY - NSPROVIDERDELIVERYNOTE_OBGYN_ALL_OB_FT
precipitous delivery of liveborn male infant from JIMENA position. Head, shoulders, and body delivered without complication. Infant was suctioned. No mec. Delayed cord clamping and infant was passed to mother. Cord clamped and cut. Placenta delivered intact with a 3 vessel cord. Fundal massage was given and uterine fundus was found to be firm. Vaginal exam revealed an intact cervix, vaginal walls and sulci. Patient had a second degree laceration in the perineum that was repaired with 2.0 chromic suture.  The uterus has transient atony, which resolved with hemabate IM and cytotec 1000mg per rectum Excellent hemostasis was noted. Patient was stable and went to recovery. Count was correct x 2.

## 2021-05-24 NOTE — DISCHARGE NOTE OB - MEDICATION SUMMARY - MEDICATIONS TO CHANGE
I will SWITCH the dose or number of times a day I take the medications listed below when I get home from the hospital:  None Normal

## 2021-05-24 NOTE — OB PROVIDER TRIAGE NOTE - NSHPPHYSICALEXAM_GEN_ALL_CORE
LS bilaterally  abd soft gravid NT  CV RRR  TAS: vertex  FHT: moderate variability  toco: irritability, denies pain or contractions  Vital Signs Last 24 Hrs  T(C): 36.8 (24 May 2021 05:23), Max: 36.8 (24 May 2021 05:19)  T(F): 98.2 (24 May 2021 05:23), Max: 98.24 (24 May 2021 05:19)  HR: 108 (24 May 2021 05:23) (108 - 108)  BP: 116/73 (24 May 2021 05:23) (116/73 - 116/73)  BP(mean): --  RR: 18 (24 May 2021 05:23) (18 - 18)  SpO2: --

## 2021-05-24 NOTE — OB PROVIDER TRIAGE NOTE - NSOBPROVIDERNOTE_OBGYN_ALL_OB_FT
32 yo  @ 39.1 wks reporting here for scheduled elective induction for advanced labor, as per patient. denies vb, lof, contractions, reports +GFM. pt reports has contractions x 2 weeks bewteen 1-4pm but not the last 2 days. AP course complicated by being conceived on Letrozole, hx of prec delivery and hx of short cx on progesterone in last pregnancy, reports say this preg vanishing twin. s/p echo. denies fever chills ha n/v new swelling vision changes cp sob cough. last saw OB last Monday, EFW 7#8.    GBS: negative  meds: PNV iron- stopped 3 days ago  all: macrobid  PMH: denies  PSH: denies  gyn hx: denies  ob hx:  2019 short cx on progesterone    d/w Dr Sim admit for IOL for Pitocin @ 39.1 wks  for Pitocin  for Epidural  prenatals reviewed  covid results negative from Friday

## 2021-05-24 NOTE — OB PROVIDER H&P - PROBLEM SELECTOR PLAN 1
d/w Dr Sim admit for IOL for Pitocin @ 39.1 wks  for Pitocin  for Epidural  prenatals reviewed  covid results negative from Friday

## 2021-05-24 NOTE — OB RN DELIVERY SUMMARY - NSSELHIDDEN_OBGYN_ALL_OB_FT
[NS_DeliveryAttending1_OBGYN_ALL_OB_FT:[email or web address]=],[NS_DeliveryRN_OBGYN_ALL_OB_FT:[id number]]

## 2021-05-24 NOTE — OB PROVIDER LABOR PROGRESS NOTE - NS_SUBJECTIVE/OBJECTIVE_OBGYN_ALL_OB_FT
R1 Progress Note     Patient on pitocin, examined for cervical change. Patient comfortable at this time.

## 2021-05-24 NOTE — DISCHARGE NOTE OB - MATERIALS PROVIDED
Vaccinations/[facility]  Screening Program/  Immunization Record/Bottle Feeding Log/Breastfeeding Mother’s Support Group Information/Guide to Postpartum Care/[facility] Hearing Screen Program/Back To Sleep Handout/Shaken Baby Prevention Handout/Breastfeeding Guide and Packet/Birth Certificate Instructions

## 2021-05-24 NOTE — OB RN TRIAGE NOTE - NS_LABORDETAILS_OBGYN_ALL_OB
None Continue Regimen: Clindamycin 1% topical cream\\nTretinoin 0.1% topical cream Detail Level: Zone

## 2021-05-24 NOTE — CHART NOTE - NSCHARTNOTEFT_GEN_A_CORE
R1 Labor Note    Patient examined for AROM. AROM performed, clear fluid. Exam unchanged.     FHR: 140, mod neida, +accels, -decels     Cont pitocin   Anticipate     Gina Baig MD PGY1  d/w Dr. Larose

## 2021-05-25 VITALS
SYSTOLIC BLOOD PRESSURE: 115 MMHG | OXYGEN SATURATION: 100 % | RESPIRATION RATE: 18 BRPM | TEMPERATURE: 98 F | HEART RATE: 81 BPM | DIASTOLIC BLOOD PRESSURE: 72 MMHG

## 2021-05-25 RX ADMIN — Medication 975 MILLIGRAM(S): at 10:47

## 2021-05-25 RX ADMIN — Medication 975 MILLIGRAM(S): at 10:12

## 2021-05-25 RX ADMIN — Medication 600 MILLIGRAM(S): at 00:25

## 2021-05-25 RX ADMIN — Medication 600 MILLIGRAM(S): at 06:11

## 2021-05-25 RX ADMIN — Medication 975 MILLIGRAM(S): at 18:24

## 2021-05-25 RX ADMIN — SODIUM CHLORIDE 3 MILLILITER(S): 9 INJECTION INTRAMUSCULAR; INTRAVENOUS; SUBCUTANEOUS at 05:11

## 2021-05-25 RX ADMIN — Medication 1 TABLET(S): at 12:51

## 2021-05-25 RX ADMIN — Medication 600 MILLIGRAM(S): at 05:11

## 2021-05-25 RX ADMIN — Medication 600 MILLIGRAM(S): at 12:51

## 2021-05-25 RX ADMIN — Medication 600 MILLIGRAM(S): at 13:25

## 2021-05-25 NOTE — PROGRESS NOTE ADULT - SUBJECTIVE AND OBJECTIVE BOX
Post-partum Note,   She is a  31y woman who is now post-partum day: 1    Subjective:  The patient feels well.  She is ambulating.   She is tolerating regular diet.  She denies nausea and vomiting; denies fever.  She is voiding.  Her pain is controlled.  She reports normal postpartum bleeding.  She is breastfeeding.  She is formula feeding.    Physical exam:    Vital Signs Last 24 Hrs  T(C): 36.7 (25 May 2021 05:49), Max: 37.0 (24 May 2021 11:39)  T(F): 98 (25 May 2021 05:49), Max: 98.6 (24 May 2021 11:39)  HR: 80 (25 May 2021 05:49) (73 - 167)  BP: 103/72 (25 May 2021 05:49) (95/59 - 145/62)  BP(mean): --  RR: 18 (25 May 2021 05:49) (18 - 19)  SpO2: 99% (25 May 2021 05:49) (95% - 100%)    Gen: NAD  Breast: Soft, nontender, not engorged.  Abdomen: Soft, nontender, no distension , firm uterine fundus at umbilicus.  Pelvic: Normal lochia noted  Ext: No calf tenderness    LABS:                        11.0   8.94  )-----------( 373      ( 24 May 2021 07:28 )             32.6       Rubella status:     Allergies    Macrobid (Rash)    Intolerances      MEDICATIONS  (STANDING):  acetaminophen   Tablet .. 975 milliGRAM(s) Oral <User Schedule>  diphtheria/tetanus/pertussis (acellular) Vaccine (ADAcel) 0.5 milliLiter(s) IntraMuscular once  ibuprofen  Tablet. 600 milliGRAM(s) Oral every 6 hours  ketorolac   Injectable 30 milliGRAM(s) IV Push once  oxytocin Infusion 333.333 milliUNIT(s)/Min (1000 mL/Hr) IV Continuous <Continuous>  oxytocin Infusion 333.333 milliUNIT(s)/Min (1000 mL/Hr) IV Continuous <Continuous>  prenatal multivitamin 1 Tablet(s) Oral daily  sodium chloride 0.9% lock flush 3 milliLiter(s) IV Push every 8 hours    MEDICATIONS  (PRN):  benzocaine 20%/menthol 0.5% Spray 1 Spray(s) Topical every 6 hours PRN for Perineal discomfort  dibucaine 1% Ointment 1 Application(s) Topical every 6 hours PRN Perineal discomfort  diphenhydrAMINE 25 milliGRAM(s) Oral every 6 hours PRN Pruritus  hydrocortisone 1% Cream 1 Application(s) Topical every 6 hours PRN Moderate Pain (4-6)  lanolin Ointment 1 Application(s) Topical every 6 hours PRN nipple soreness  magnesium hydroxide Suspension 30 milliLiter(s) Oral two times a day PRN Constipation  oxyCODONE    IR 5 milliGRAM(s) Oral every 3 hours PRN Moderate to Severe Pain (4-10)  oxyCODONE    IR 5 milliGRAM(s) Oral once PRN Moderate to Severe Pain (4-10)  pramoxine 1%/zinc 5% Cream 1 Application(s) Topical every 4 hours PRN Moderate Pain (4-6)  simethicone 80 milliGRAM(s) Chew every 4 hours PRN Gas  witch hazel Pads 1 Application(s) Topical every 4 hours PRN Perineal discomfort        Assessment and Plan  PPD #1 s/p   Doing well.  Encourage ambulation.  PP & PPD Instructions reviewed.  CPC.  D/C home today.

## 2021-12-16 ENCOUNTER — TRANSCRIPTION ENCOUNTER (OUTPATIENT)
Age: 31
End: 2021-12-16

## 2022-07-15 NOTE — OB PROVIDER DELIVERY SUMMARY - NSPLACINTACT_OBGYN_ALL_OB
Yes Melolabial Transposition Flap Text: In order to maintain form and function of the airway, and to avoid distortion, a nasolabial transposition flap with cheek advancement flap closure at the donor site was planned. After prep and local anesthesia, a Burow's triangle was excised superiorly toward the inner canthus.  An incision was made inferiorly in the nasolabial fold to the lateral commissure of the mouth, then extended superiorly on the medial cheek where a nasolabial flap was elevated by undermining the skin in the subcutaneous plane of the cheek.  The primary defect on the nose was also undermined. The flap was distally thinned, transposed into place, amputated at the tip to fit the defect, and sutured to the nose defect in a layered fashion.  See above for size of this flap.  \\n\\nTo close the donor-site defect on the cheek, a cheek advancement flap was elevated by undermining the skin of the cheek in the subcutaneous plane laterally beyond the lateral canthus and superiorly above the orbital rim. After hemostasis was obtained, the flap was advanced medially and attached with peristeal stitches to the pyriform aperture of the maxilla and to the ascending portion of the maxilla. Remaining wound edges were closed in a layered fashion.  This cheek advancement flap measured 3 x 4 cm.

## 2022-08-12 NOTE — OB RN DELIVERY SUMMARY - NS_BREASTINHOURA_OBGYN_ALL_OB
Duration Of Freeze Thaw-Cycle (Seconds): 0 Post-Care Instructions: I reviewed with the patient in detail post-care instructions. Patient is to wear sunprotection, and avoid picking at any of the treated lesions. Pt may apply Vaseline to crusted or scabbing areas. Render Note In Bullet Format When Appropriate: No Number Of Freeze-Thaw Cycles: 2 freeze-thaw cycles Show Aperture Variable?: Yes Consent: The patient's consent was obtained including but not limited to risks of crusting, scabbing, blistering, scarring, darker or lighter pigmentary change, recurrence, incomplete removal and infection. Detail Level: Detailed Offered, declined by mother

## 2022-12-01 NOTE — OB RN DELIVERY SUMMARY - NS_SKININTERRUPTA_OBGYN_ALL_OB
Mom called back and gave me an update, Дмитрий Golden is doing much better  His eye discharge is improved, the fever has broke, the albuterol has helped him breathe easier and mom will wean him off of it gradually as tolerated  Chunky's clinical indication

## 2023-07-03 NOTE — OB RN PATIENT PROFILE - PRO INTERPRETER NEED 2
Refill request for   clonazePAM (KlonoPIN) 0.5 MG tablet 90 tablet 5 1/27/2023     Sig - Route: TAKE 3 TABLETS BY MOUTH AT BEDTIME - Oral    Sent to pharmacy as: clonazePAM 0.5 MG Oral Tablet (KlonoPIN)    Class: Eprescribe      Last office visit: 12/22/22  Advised follow-up: 1 year  Next office visit: 1/2/24    Per Note from last OV on 12/22/22: \"No change in her ropinirole or clonazepam.  I am also going to refer her for a sleep medicine evaluation.  Return in 1 year.\"    PDMP Writer does not have access to PDMP, provider will need to check PDMP and update prescription.      
English

## 2024-03-29 NOTE — DISCHARGE NOTE OB - MEDICATION SUMMARY - MEDICATIONS TO TAKE
Statement Selected
I will START or STAY ON the medications listed below when I get home from the hospital:    acetaminophen 325 mg oral tablet  -- 3 tab(s) by mouth   -- Indication: For      hydrocortisone 1% topical cream  -- 1 application on skin every 6 hours, As needed, Moderate Pain (4-6)  -- Indication: For      lanolin topical ointment  -- 1 application on skin every 6 hours, As needed, nipple soreness  -- Indication: For

## 2024-10-01 NOTE — OB RN PATIENT PROFILE - THE IMPORTANCE OF INITIATING BREASTFEEDING WITHIN THE FIRST HOUR OF BIRTH.
Problem: At Risk for Falls  Goal: Patient does not fall  Outcome: Monitoring/Evaluating progress  Goal: Patient takes action to control fall-related risks  Outcome: Monitoring/Evaluating progress     Problem: At Risk for Injury Due to Fall  Goal: Patient does not fall  Outcome: Monitoring/Evaluating progress  Goal: Takes action to control condition specific risks  Outcome: Monitoring/Evaluating progress  Goal: Verbalizes understanding of fall-related injury personal risks  Description: Document education using the patient education activity  Outcome: Monitoring/Evaluating progress     Problem: Fluid Volume Excess  Goal: Fluid Volume Excess Symptoms Resolved  Description: Treatment often consists of oxygen and respiratory support with diuretic therapy at doses that exceed usual dose (typically doubled).  Monitor patient response to treatment.    Acute dyspnea should resolve quickly if dose is adequate and kidney function is adequate. Dyspnea/SOB should only be observed with Activity after effective treatment. Patient should be able to lie down comfortably, without SOB.  Outcome: Monitoring/Evaluating progress  Goal: Oxygenation is maintained (SpO2 greater than or equal to 90% or as ordered)  Outcome: Monitoring/Evaluating progress  Goal: Verbalizes understanding of FVE management plan  Description: Document on Patient Education Activity  Outcome: Monitoring/Evaluating progress     Problem: Diabetes  Goal: Achieves glycemic balance  Description: Goal is to maintain blood sugar within range with no episodes of hypoglycemia  Outcome: Monitoring/Evaluating progress  Goal: Verbalizes/demonstrates understanding of NEW diagnosis of diabetes and management  Description: Document on Patient Education Activity  Outcome: Monitoring/Evaluating progress  Goal: Verbalizes understanding of diabetes management including how to use HbA1C to evaluate status of blood sugar over time (Diabetes is NOT a new diagnosis)  Description:  Diabetes Education  Outcome: Monitoring/Evaluating progress  Goal: Demonstrates ability to self-administer insulin  Description: Document on Patient Education Activity  Outcome: Monitoring/Evaluating progress     Problem: Pain  Goal: Acceptable pain level achieved/maintained at rest using appropriate pain scale for the patient  Outcome: Monitoring/Evaluating progress  Goal: Acceptable pain level achieved/maintained with activity using appropriate pain scale for the patient  Outcome: Monitoring/Evaluating progress  Goal: Acceptable pain level achieved/maintained without oversedation  Outcome: Monitoring/Evaluating progress      Statement Selected